# Patient Record
Sex: MALE | Race: WHITE | Employment: OTHER | RURAL
[De-identification: names, ages, dates, MRNs, and addresses within clinical notes are randomized per-mention and may not be internally consistent; named-entity substitution may affect disease eponyms.]

---

## 2017-01-01 ENCOUNTER — TELEPHONE (OUTPATIENT)
Dept: FAMILY MEDICINE CLINIC | Age: 72
End: 2017-01-01

## 2017-01-01 ENCOUNTER — APPOINTMENT (OUTPATIENT)
Dept: GENERAL RADIOLOGY | Age: 72
End: 2017-01-01
Attending: EMERGENCY MEDICINE
Payer: MEDICARE

## 2017-01-01 ENCOUNTER — HOSPITAL ENCOUNTER (EMERGENCY)
Age: 72
Discharge: HOME | End: 2017-04-07
Attending: EMERGENCY MEDICINE | Admitting: EMERGENCY MEDICINE
Payer: MEDICARE

## 2017-01-01 ENCOUNTER — HOSPITAL ENCOUNTER (EMERGENCY)
Age: 72
Discharge: HOME | End: 2017-03-21
Attending: EMERGENCY MEDICINE | Admitting: EMERGENCY MEDICINE
Payer: MEDICARE

## 2017-01-01 VITALS
BODY MASS INDEX: 35.99 KG/M2 | HEART RATE: 95 BPM | TEMPERATURE: 98.3 F | HEIGHT: 65 IN | OXYGEN SATURATION: 94 % | RESPIRATION RATE: 16 BRPM | SYSTOLIC BLOOD PRESSURE: 115 MMHG | DIASTOLIC BLOOD PRESSURE: 84 MMHG | WEIGHT: 216 LBS

## 2017-01-01 VITALS
BODY MASS INDEX: 35.99 KG/M2 | RESPIRATION RATE: 16 BRPM | DIASTOLIC BLOOD PRESSURE: 91 MMHG | WEIGHT: 216 LBS | TEMPERATURE: 97.3 F | SYSTOLIC BLOOD PRESSURE: 131 MMHG | HEIGHT: 65 IN | HEART RATE: 83 BPM | OXYGEN SATURATION: 92 %

## 2017-01-01 DIAGNOSIS — N30.01 ACUTE CYSTITIS WITH HEMATURIA: Primary | ICD-10-CM

## 2017-01-01 DIAGNOSIS — K59.00 CONSTIPATION, UNSPECIFIED CONSTIPATION TYPE: Primary | ICD-10-CM

## 2017-01-01 LAB
ALBUMIN SERPL BCP-MCNC: 2.9 G/DL (ref 3.5–5)
ALBUMIN SERPL BCP-MCNC: 3.1 G/DL (ref 3.5–5)
ALBUMIN/GLOB SERPL: 0.5 {RATIO} (ref 1.1–2.2)
ALBUMIN/GLOB SERPL: 0.6 {RATIO} (ref 1.1–2.2)
ALP SERPL-CCNC: 88 U/L (ref 45–117)
ALP SERPL-CCNC: 91 U/L (ref 45–117)
ALT SERPL-CCNC: 19 U/L (ref 12–78)
ALT SERPL-CCNC: 19 U/L (ref 12–78)
ANION GAP BLD CALC-SCNC: 4 MMOL/L (ref 5–15)
ANION GAP BLD CALC-SCNC: 7 MMOL/L (ref 5–15)
APPEARANCE UR: ABNORMAL
AST SERPL W P-5'-P-CCNC: 19 U/L (ref 15–37)
AST SERPL W P-5'-P-CCNC: 23 U/L (ref 15–37)
BACTERIA SPEC CULT: ABNORMAL
BACTERIA URNS QL MICRO: ABNORMAL /HPF
BASOPHILS # BLD AUTO: 0.1 K/UL (ref 0–0.1)
BASOPHILS # BLD AUTO: 0.1 K/UL (ref 0–0.1)
BASOPHILS # BLD: 1 % (ref 0–1)
BASOPHILS # BLD: 1 % (ref 0–1)
BILIRUB SERPL-MCNC: 0.3 MG/DL (ref 0.2–1)
BILIRUB SERPL-MCNC: 0.4 MG/DL (ref 0.2–1)
BILIRUB UR QL: NEGATIVE
BUN SERPL-MCNC: 24 MG/DL (ref 6–20)
BUN SERPL-MCNC: 30 MG/DL (ref 6–20)
BUN/CREAT SERPL: 27 (ref 12–20)
BUN/CREAT SERPL: 29 (ref 12–20)
CALCIUM SERPL-MCNC: 9.5 MG/DL (ref 8.5–10.1)
CALCIUM SERPL-MCNC: 9.7 MG/DL (ref 8.5–10.1)
CC UR VC: ABNORMAL
CHLORIDE SERPL-SCNC: 101 MMOL/L (ref 97–108)
CHLORIDE SERPL-SCNC: 99 MMOL/L (ref 97–108)
CO2 SERPL-SCNC: 34 MMOL/L (ref 21–32)
CO2 SERPL-SCNC: 35 MMOL/L (ref 21–32)
COLOR UR: ABNORMAL
CREAT SERPL-MCNC: 0.82 MG/DL (ref 0.7–1.3)
CREAT SERPL-MCNC: 1.11 MG/DL (ref 0.7–1.3)
EOSINOPHIL # BLD: 0.2 K/UL (ref 0–0.4)
EOSINOPHIL # BLD: 0.2 K/UL (ref 0–0.4)
EOSINOPHIL NFR BLD: 2 % (ref 0–7)
EOSINOPHIL NFR BLD: 3 % (ref 0–7)
EPITH CASTS URNS QL MICRO: ABNORMAL /LPF
ERYTHROCYTE [DISTWIDTH] IN BLOOD BY AUTOMATED COUNT: 15.7 % (ref 11.5–14.5)
ERYTHROCYTE [DISTWIDTH] IN BLOOD BY AUTOMATED COUNT: 15.8 % (ref 11.5–14.5)
GLOBULIN SER CALC-MCNC: 5.1 G/DL (ref 2–4)
GLOBULIN SER CALC-MCNC: 5.4 G/DL (ref 2–4)
GLUCOSE SERPL-MCNC: 83 MG/DL (ref 65–100)
GLUCOSE SERPL-MCNC: 92 MG/DL (ref 65–100)
GLUCOSE UR STRIP.AUTO-MCNC: NEGATIVE MG/DL
HCT VFR BLD AUTO: 45 % (ref 36.6–50.3)
HCT VFR BLD AUTO: 46 % (ref 36.6–50.3)
HGB BLD-MCNC: 15 G/DL (ref 12.1–17)
HGB BLD-MCNC: 15.4 G/DL (ref 12.1–17)
HGB UR QL STRIP: ABNORMAL
HYALINE CASTS URNS QL MICRO: ABNORMAL /LPF (ref 0–5)
KETONES UR QL STRIP.AUTO: ABNORMAL MG/DL
LEUKOCYTE ESTERASE UR QL STRIP.AUTO: ABNORMAL
LYMPHOCYTES # BLD AUTO: 15 % (ref 12–49)
LYMPHOCYTES # BLD AUTO: 25 % (ref 12–49)
LYMPHOCYTES # BLD: 1.6 K/UL (ref 0.8–3.5)
LYMPHOCYTES # BLD: 2.1 K/UL (ref 0.8–3.5)
MCH RBC QN AUTO: 29 PG (ref 26–34)
MCH RBC QN AUTO: 30.4 PG (ref 26–34)
MCHC RBC AUTO-ENTMCNC: 32.6 G/DL (ref 30–36.5)
MCHC RBC AUTO-ENTMCNC: 34.2 G/DL (ref 30–36.5)
MCV RBC AUTO: 88.9 FL (ref 80–99)
MCV RBC AUTO: 89 FL (ref 80–99)
MONOCYTES # BLD: 0.9 K/UL (ref 0–1)
MONOCYTES # BLD: 1.4 K/UL (ref 0–1)
MONOCYTES NFR BLD AUTO: 10 % (ref 5–13)
MONOCYTES NFR BLD AUTO: 14 % (ref 5–13)
MUCOUS THREADS URNS QL MICRO: ABNORMAL /LPF
NEUTS SEG # BLD: 5.2 K/UL (ref 1.8–8)
NEUTS SEG # BLD: 7 K/UL (ref 1.8–8)
NEUTS SEG NFR BLD AUTO: 61 % (ref 32–75)
NEUTS SEG NFR BLD AUTO: 68 % (ref 32–75)
NITRITE UR QL STRIP.AUTO: POSITIVE
PH UR STRIP: 5.5 [PH] (ref 5–8)
PLATELET # BLD AUTO: 207 K/UL (ref 150–400)
PLATELET # BLD AUTO: 231 K/UL (ref 150–400)
POTASSIUM SERPL-SCNC: 4.5 MMOL/L (ref 3.5–5.1)
POTASSIUM SERPL-SCNC: 4.5 MMOL/L (ref 3.5–5.1)
PROT SERPL-MCNC: 8.2 G/DL (ref 6.4–8.2)
PROT SERPL-MCNC: 8.3 G/DL (ref 6.4–8.2)
PROT UR STRIP-MCNC: 100 MG/DL
RBC # BLD AUTO: 5.06 M/UL (ref 4.1–5.7)
RBC # BLD AUTO: 5.17 M/UL (ref 4.1–5.7)
RBC #/AREA URNS HPF: ABNORMAL /HPF (ref 0–5)
SERVICE CMNT-IMP: ABNORMAL
SODIUM SERPL-SCNC: 138 MMOL/L (ref 136–145)
SODIUM SERPL-SCNC: 142 MMOL/L (ref 136–145)
SP GR UR REFRACTOMETRY: 1.02 (ref 1–1.03)
UROBILINOGEN UR QL STRIP.AUTO: 1 EU/DL (ref 0.2–1)
WBC # BLD AUTO: 10.3 K/UL (ref 4.1–11.1)
WBC # BLD AUTO: 8.5 K/UL (ref 4.1–11.1)
WBC URNS QL MICRO: ABNORMAL /HPF (ref 0–4)

## 2017-01-01 PROCEDURE — 74011250636 HC RX REV CODE- 250/636: Performed by: EMERGENCY MEDICINE

## 2017-01-01 PROCEDURE — 96361 HYDRATE IV INFUSION ADD-ON: CPT

## 2017-01-01 PROCEDURE — 74011000258 HC RX REV CODE- 258: Performed by: EMERGENCY MEDICINE

## 2017-01-01 PROCEDURE — 94762 N-INVAS EAR/PLS OXIMTRY CONT: CPT

## 2017-01-01 PROCEDURE — 80053 COMPREHEN METABOLIC PANEL: CPT | Performed by: EMERGENCY MEDICINE

## 2017-01-01 PROCEDURE — 36415 COLL VENOUS BLD VENIPUNCTURE: CPT | Performed by: EMERGENCY MEDICINE

## 2017-01-01 PROCEDURE — 74011250637 HC RX REV CODE- 250/637: Performed by: EMERGENCY MEDICINE

## 2017-01-01 PROCEDURE — 87086 URINE CULTURE/COLONY COUNT: CPT | Performed by: EMERGENCY MEDICINE

## 2017-01-01 PROCEDURE — 74022 RADEX COMPL AQT ABD SERIES: CPT

## 2017-01-01 PROCEDURE — 85025 COMPLETE CBC W/AUTO DIFF WBC: CPT | Performed by: EMERGENCY MEDICINE

## 2017-01-01 PROCEDURE — 87186 SC STD MICRODIL/AGAR DIL: CPT | Performed by: EMERGENCY MEDICINE

## 2017-01-01 PROCEDURE — 96365 THER/PROPH/DIAG IV INF INIT: CPT

## 2017-01-01 PROCEDURE — 99285 EMERGENCY DEPT VISIT HI MDM: CPT

## 2017-01-01 PROCEDURE — 77030005514 HC CATH URETH FOL14 BARD -A

## 2017-01-01 PROCEDURE — 81001 URINALYSIS AUTO W/SCOPE: CPT | Performed by: EMERGENCY MEDICINE

## 2017-01-01 PROCEDURE — 99283 EMERGENCY DEPT VISIT LOW MDM: CPT

## 2017-01-01 PROCEDURE — 87077 CULTURE AEROBIC IDENTIFY: CPT | Performed by: EMERGENCY MEDICINE

## 2017-01-01 RX ORDER — MAGNESIUM CITRATE
296 SOLUTION, ORAL ORAL
Status: COMPLETED | OUTPATIENT
Start: 2017-01-01 | End: 2017-01-01

## 2017-01-01 RX ORDER — POLYETHYLENE GLYCOL 3350 17 G/17G
17 POWDER, FOR SOLUTION ORAL 2 TIMES DAILY
Qty: 1530 G | Refills: 0 | Status: SHIPPED | OUTPATIENT
Start: 2017-01-01

## 2017-01-01 RX ORDER — FACIAL-BODY WIPES
10 EACH TOPICAL DAILY
COMMUNITY

## 2017-01-01 RX ORDER — DOCUSATE SODIUM 100 MG/1
200 CAPSULE, LIQUID FILLED ORAL 2 TIMES DAILY
COMMUNITY

## 2017-01-01 RX ORDER — CEPHALEXIN 500 MG/1
500 CAPSULE ORAL 4 TIMES DAILY
Qty: 28 CAP | Refills: 0 | Status: SHIPPED | OUTPATIENT
Start: 2017-01-01 | End: 2017-01-01

## 2017-01-01 RX ORDER — LISINOPRIL 5 MG/1
TABLET ORAL
Qty: 30 TAB | Refills: 11 | Status: SHIPPED | OUTPATIENT
Start: 2017-01-01

## 2017-01-01 RX ORDER — MAGNESIUM CITRATE
SOLUTION, ORAL ORAL
Qty: 2 BOTTLE | Refills: 0 | Status: SHIPPED | OUTPATIENT
Start: 2017-01-01

## 2017-01-01 RX ORDER — POLYETHYLENE GLYCOL 3350 17 G/17G
17 POWDER, FOR SOLUTION ORAL AS NEEDED
COMMUNITY

## 2017-01-01 RX ADMIN — CEFTRIAXONE 1 G: 1 INJECTION, POWDER, FOR SOLUTION INTRAMUSCULAR; INTRAVENOUS at 02:01

## 2017-01-01 RX ADMIN — SODIUM CHLORIDE 1000 ML: 900 INJECTION, SOLUTION INTRAVENOUS at 01:53

## 2017-01-01 RX ADMIN — MAGESIUM CITRATE 296 ML: 1.75 LIQUID ORAL at 03:09

## 2017-01-01 RX ADMIN — MAGESIUM CITRATE 296 ML: 1.75 LIQUID ORAL at 00:48

## 2017-02-17 NOTE — TELEPHONE ENCOUNTER
Pt's daughter called needs a letter stating pt is unable to shop by himself due to the different DX he has, wheelchair etc.. This is so the aids or  can go with him and can get a  pass. We are to call daughter when it is done and she will come and pick it up.

## 2017-03-21 NOTE — ED PROVIDER NOTES
HPI Comments: Moni Pitts is a 66 y.o. Male with hx of ALS, HTN, and fecal impaction who presents to the ED c/o constipation x 1 week. Per family, pt's last BM was 4-5 days ago and has been experiencing upper abdominal discomfort with PO intake which is worse tonight. Pt has reportedly been taking Colace BID, Miralax every other day, and using stool suppositories every other day but has found no relief in constipation. Family notes pt has a PEG tube that was placed in June 2807 without complications, but still continues to occasionally eat PO. Relative states that pt has been deconditioning over the past 2 months from progressively worsening ALS, and is now using a condom catheter. Also of note, pt is suppose to be using CPAP at night for his sleep apnea, but pt has been noncompliant with its use. Pt specifically denies any recent vomiting. Social hx: + Tobacco use (former smoker), - EtOH use, - Illicit drug use    PCP: NATHEN Parker/ Jem Amado    There are no other complaints, changes or physical findings at this time. The history is provided by the patient and a relative.         Past Medical History:   Diagnosis Date    ALS (amyotrophic lateral sclerosis) (HCC)     Anxiety disorder     Arrhythmia     exersize induced svt    Arthritis     BPH (benign prostatic hypertrophy)     [facility]chronic obstructive [facility] disease) ([facility] 9/4/2013    Duodenal ulcer     Falls     Fatigue     Headache     Hematuria     Hyperlipidemia     Hyperlipidemia 9/4/2013    Hypertension     IBS (irritable bowel syndrome)     Muscle weakness     Osteoarthritis     Other ill-defined conditions(799.89)     elevated cholesterol    Other ill-defined conditions(799.89)     [place] right ear    PAD (peripheral artery disease) (HCC)     Proteinuria     Psychiatric disorder     anxiety    PUD (peptic ulcer disease)     Unspecified adverse effect of anesthesia     TAKES A LONG TIME TO QWBF AFTER ANESTHESIA    Unspecified sleep apnea     no cpap       Past Surgical History:   Procedure Laterality Date    HX BACK SURGERY      HX CATARACT REMOVAL      bilateral    HX HEART CATHETERIZATION      negative    HX HEENT  11/2014    nasal turbinate reduction    HX HEMORRHOIDECTOMY      x2    HX HIP REPLACEMENT  04/2014    HX ORTHOPAEDIC      RT TOTAL HIP.     HX ORTHOPAEDIC  4/14/15    C3-C5 ANTERIOR CERVICAL DISCECTOMY WITH FUSION    HX TONSILLECTOMY      HX VASECTOMY      PLACE PERCUT GASTROSTOMY TUBE  7/8/2016         MI COLSC FLX W/RMVL OF TUMOR POLYP LESION SNARE TQ  8/13/2012              Family History:   Problem Relation Age of Onset    Cancer Mother      Breast    Hypertension Father     Obesity Father     Diabetes Father     Arthritis-osteo Sister     No Known Problems Brother      did not know his medical issues    Alcohol abuse Brother        Social History     Social History    Marital status:      Spouse name: N/A    Number of children: N/A    Years of education: N/A     Occupational History    Not on file. Social History Main Topics    Smoking status: Former Smoker     Packs/day: 1.00     Years: 55.00     Quit date: 4/20/2015    Smokeless tobacco: Never Used    Alcohol use No      Comment: occasional    Drug use: No    Sexual activity: Not Currently     Partners: Female     Other Topics Concern    Not on file     Social History Narrative         ALLERGIES: Nsaids (non-steroidal anti-inflammatory drug); Other medication; and No known drug allergies    Review of Systems   Constitutional: Negative. Negative for appetite change, chills, fatigue and fever. HENT: Negative. Negative for congestion and sore throat. Eyes: Negative. Negative for visual disturbance. Respiratory: Negative. Negative for cough and shortness of breath. Cardiovascular: Negative. Negative for chest pain, palpitations and leg swelling.    Gastrointestinal: Positive for abdominal pain and constipation. Negative for diarrhea, nausea and vomiting. Genitourinary: Negative. Negative for decreased urine volume, dysuria, flank pain and hematuria. Musculoskeletal: Negative. Negative for back pain and neck pain. Skin: Negative. Negative for rash. Neurological: Negative. Negative for dizziness, syncope, weakness, numbness and headaches. Hematological: Negative. Psychiatric/Behavioral: Negative. All other systems reviewed and are negative. Patient Vitals for the past 12 hrs:   Temp Pulse Resp BP SpO2   03/21/17 0222 - 79 23 - 92 %   03/21/17 0221 - 82 20 - (!) 87 %   03/21/17 0215 - 81 26 (!) 148/105 (!) 87 %   03/21/17 0201 - 81 20 - -   03/21/17 0200 - 73 9 (!) 131/101 -   03/21/17 0149 - 74 (!) 6 (!) 147/96 -   03/21/17 0115 - 82 26 (!) 148/135 91 %   03/21/17 0103 - 80 25 - 92 %   03/21/17 0100 - 84 25 (!) 143/106 92 %   03/21/17 0055 - 82 28 (!) 125/103 90 %   03/21/17 0039 - - - - 92 %   03/21/17 0004 98.3 °F (36.8 °C) 85 20 (!) 153/110 -              Physical Exam   Constitutional: He is oriented to person, place, and time. He appears well-developed and well-nourished. No distress. HENT:   Head: Normocephalic and atraumatic. Nose: Nose normal.   Mouth/Throat: No oropharyngeal exudate. Eyes: Conjunctivae and EOM are normal. Pupils are equal, round, and reactive to light. Neck: Normal range of motion. Neck supple. No JVD present. Cardiovascular: Normal rate, regular rhythm, normal heart sounds and intact distal pulses. Exam reveals no friction rub. No murmur heard. Pulmonary/Chest: Effort normal and breath sounds normal. No stridor. No respiratory distress. He has no wheezes. He has no rales. Abdominal: Soft. Bowel sounds are normal. He exhibits no distension. There is no tenderness. There is no rebound. Musculoskeletal: Normal range of motion. He exhibits no tenderness. Neurological: He is alert and oriented to person, place, and time.  He displays atrophy. He displays no tremor. No cranial nerve deficit. He exhibits abnormal muscle tone (pt with progressive ALS, w/c bound, minimal independet movements). He displays no seizure activity. Gait abnormal. GCS eye subscore is 4. GCS verbal subscore is 5. Skin: Skin is warm and dry. No rash noted. He is not diaphoretic. Psychiatric: He has a normal mood and affect. His behavior is normal. Judgment and thought content normal. His speech is slurred (2/2 ALS, deliberate and understandable). Cognition and memory are normal.   Nursing note and vitals reviewed. MDM  Number of Diagnoses or Management Options  Constipation, unspecified constipation type:   Diagnosis management comments: DDX:  Constipation, sbo, aspiration, pna, electrolyte abnormality    Plan:  Labs, acute series, sse, mag citrate    Impression:  constipation         Amount and/or Complexity of Data Reviewed  Clinical lab tests: ordered and reviewed  Tests in the radiology section of CPT®: ordered and reviewed  Obtain history from someone other than the patient: yes (Family)  Review and summarize past medical records: yes    Patient Progress  Patient progress: improved      Procedures    I reviewed our electronic medical record system for any past medical records that were available that may contribute to the patients current condition, the nursing notes and and vital signs from today's visit    Nursing notes will be reviewed as they become available in realtime while the pt has been in the ED. Ines Ojeda MD    1:02 AM  Pt's rate is 85 with a NSR rhythm as noted on Cardiac monitor. SpO2 is 94%, on 2L    I personally reviewed pt's imaging. Official read by radiology listed below. Ines Ojeda MD    4:12 AM  Progress note:  Pt noted to be feeling better, able to pass some stool notes he feels ready for discharge. Discussed lab and imaging findings with pt and family. Will follow up as instructed.  All questions have been answered, pt voiced understanding and agreement with plan. If narcotics were prescribed, pt was advised not to drive or operate heavy machinery. If abx were prescribed, pt advised that diarrhea and rash are possible side effects of the medications. Specific return precautions provided as well as instructions to return to the ED should sx worsen at any time. Kleber Louis MD    LABORATORY TESTS:  Recent Results (from the past 12 hour(s))   CBC WITH AUTOMATED DIFF    Collection Time: 03/21/17 12:30 AM   Result Value Ref Range    WBC 8.5 4.1 - 11.1 K/uL    RBC 5.06 4.10 - 5.70 M/uL    HGB 15.4 12.1 - 17.0 g/dL    HCT 45.0 36.6 - 50.3 %    MCV 88.9 80.0 - 99.0 FL    MCH 30.4 26.0 - 34.0 PG    MCHC 34.2 30.0 - 36.5 g/dL    RDW 15.8 (H) 11.5 - 14.5 %    PLATELET 011 918 - 674 K/uL    NEUTROPHILS 61 32 - 75 %    LYMPHOCYTES 25 12 - 49 %    MONOCYTES 10 5 - 13 %    EOSINOPHILS 3 0 - 7 %    BASOPHILS 1 0 - 1 %    ABS. NEUTROPHILS 5.2 1.8 - 8.0 K/UL    ABS. LYMPHOCYTES 2.1 0.8 - 3.5 K/UL    ABS. MONOCYTES 0.9 0.0 - 1.0 K/UL    ABS. EOSINOPHILS 0.2 0.0 - 0.4 K/UL    ABS. BASOPHILS 0.1 0.0 - 0.1 K/UL   METABOLIC PANEL, COMPREHENSIVE    Collection Time: 03/21/17 12:30 AM   Result Value Ref Range    Sodium 142 136 - 145 mmol/L    Potassium 4.5 3.5 - 5.1 mmol/L    Chloride 101 97 - 108 mmol/L    CO2 34 (H) 21 - 32 mmol/L    Anion gap 7 5 - 15 mmol/L    Glucose 83 65 - 100 mg/dL    BUN 24 (H) 6 - 20 MG/DL    Creatinine 0.82 0.70 - 1.30 MG/DL    BUN/Creatinine ratio 29 (H) 12 - 20      GFR est AA >60 >60 ml/min/1.73m2    GFR est non-AA >60 >60 ml/min/1.73m2    Calcium 9.7 8.5 - 10.1 MG/DL    Bilirubin, total 0.3 0.2 - 1.0 MG/DL    ALT (SGPT) 19 12 - 78 U/L    AST (SGOT) 19 15 - 37 U/L    Alk. phosphatase 91 45 - 117 U/L    Protein, total 8.2 6.4 - 8.2 g/dL    Albumin 3.1 (L) 3.5 - 5.0 g/dL    Globulin 5.1 (H) 2.0 - 4.0 g/dL    A-G Ratio 0.6 (L) 1.1 - 2.2         IMAGING RESULTS:  XR ABD ACUTE W 1 V CHEST   INDICATION: constipation, ? aspiration      EXAM: ACUTE ABDOMINAL SERIES      COMPARISON: 8/17/2016     FINDINGS:  Single view of the chest demonstrates a normal cardiomediastinal silhouette. The lungs are well expanded and clear. There is no free intraperitoneal air. Supine and upright views of the abdomen demonstrate a nonobstructive bowel gas  pattern. Gas is seen throughout normal caliber colon. There is no abnormal  bowel dilatation. There is a right hip arthroplasty.     IMPRESSION  IMPRESSION:  No acute process. Moderate constipation. MEDICATIONS GIVEN:  Medications   magnesium citrate solution 296 mL (296 mL Oral Given 3/21/17 0309)       IMPRESSION:  1. Constipation, unspecified constipation type        PLAN:  1. Discharge home  Current Discharge Medication List      START taking these medications    Details   ! ! polyethylene glycol (MIRALAX) 17 gram/dose powder Take 17 g by mouth two (2) times a day. 1 tablespoon with 8 oz of water daily  Qty: 1530 g, Refills: 0       !! - Potential duplicate medications found. Please discuss with provider. CONTINUE these medications which have NOT CHANGED    Details   ! ! polyethylene glycol (MIRALAX) 17 gram/dose powder Take 17 g by mouth as needed. !! - Potential duplicate medications found. Please discuss with provider. 2.   Follow-up Information     Follow up With Details Comments [address] [place], [place] Schedule an appointment as soon as possible for a visit in 2 days  [address] [phone]        Return to ED if worse     DISCHARGE NOTE  4:12 AM  The patient has been re-evaluated and is ready for discharge. Reviewed available results with patient. Counseled pt on diagnosis and care plan. Pt has expressed understanding, and all questions have been answered. Pt agrees with plan and agrees to F/U as recommended, or return to the ED if their sxs worsen.  Discharge instructions have been provided and explained to the pt, along with reasons to return to the ED. This note is prepared by Carol Crain, acting as Scribe for Emily Treviño MD.    Emily Treviño MD: The scribe's documentation has been prepared under my direction and personally reviewed by me in its entirety. I confirm that the note above accurately reflects all work, treatment, procedures, and medical decision making performed by me. This note will not be viewable in [address]

## 2017-03-21 NOTE — ED NOTES
Discharge instructions reviewed with pt and copy given along with RX by ER MD Kevin Costello. Patient discharged by personal wheelchair accompanied by family x2 in no sign of distress or discomfort at this time.

## 2017-03-21 NOTE — ED NOTES
Pt condom catheter and urine bag changed out at this time. Patient repositioned in ED bed by ED staff x2. Call light within reach. Family x2 at bedside. Patient placed on 2L nasal cannula at this time to bring oxygen saturation above 90%.  ER MD made aware

## 2017-03-21 NOTE — ED NOTES
Patient reports he has not had BM in the past week and has been progressively having declining symptoms with his ALS over the past 2 months and having increased difficulty having BMs. ER MD Brennan Rossi at bedside evaluating patients at this time.

## 2017-03-21 NOTE — ED NOTES
Patient reports he \"feels a little better at this time that if his pain was a 4 before out of 10 that it is now a 2 out of 10.\" ER MD Avanell Koyanagi made aware

## 2017-03-21 NOTE — DISCHARGE INSTRUCTIONS
Constipation: Care Instructions  Your Care Instructions  Constipation means that you have a hard time passing stools (bowel movements). People pass stools from 3 times a day to once every 3 days. What is normal for you may be different. Constipation may occur with pain in the rectum and cramping. The pain may get worse when you try to pass stools. Sometimes there are small amounts of bright red blood on toilet paper or the surface of stools. This is because of enlarged veins near the rectum (hemorrhoids). A few changes in your diet and lifestyle may help you avoid ongoing constipation. Your doctor may also prescribe medicine to help loosen your stool. Some medicines can cause constipation. These include pain medicines and antidepressants. Tell your doctor about all the medicines you take. Your doctor may want to make a medicine change to ease your symptoms. Follow-up care is a key part of your treatment and safety. Be sure to make and go to all appointments, and call your doctor if you are having problems. It's also a good idea to know your test results and keep a list of the medicines you take. How can you care for yourself at home? · Drink plenty of fluids, enough so that your urine is light yellow or clear like water. If you have kidney, heart, or liver disease and have to limit fluids, talk with your doctor before you increase the amount of fluids you drink. · Include high-fiber foods in your diet each day. These include fruits, vegetables, beans, and whole grains. · Get at least 30 minutes of exercise on most days of the week. Walking is a good choice. You also may want to do other activities, such as running, swimming, cycling, or playing tennis or team sports. · Take a fiber supplement, such as Citrucel or Metamucil, every day. Read and follow all instructions on the label. · Schedule time each day for a bowel movement. A daily routine may help.  Take your time having your bowel movement. · Support your feet with a small step stool when you sit on the toilet. This helps flex your hips and places your pelvis in a squatting position. · Your doctor may recommend an over-the-counter laxative to relieve your constipation. Examples are Milk of Magnesia and MiraLax. Read and follow all instructions on the label. Do not use laxatives on a long-term basis. When should you call for help? Call your doctor now or seek immediate medical care if:  · You have new or worse belly pain. · You have new or worse nausea or vomiting. · You have blood in your stools. Watch closely for changes in your health, and be sure to contact your doctor if:  · Your constipation is getting worse. · You do not get better as expected. Where can you learn more? Go to http://arnoldo-jackie.info/. Enter 21 [phone] in the search box to learn more about \"Constipation: Care Instructions. \"  Current as of: May 27, 2016  Content Version: 11.1  © 3858-2959 [employer]. Care instructions adapted under license by [employer] (which disclaims liability or warranty for this information). If you have questions about a medical condition or this instruction, always ask your healthcare professional. [address] any warranty or liability for your use of this information.

## 2017-04-07 NOTE — ED NOTES
Patient placed in wheelchair with shantell lift by lift team at this time. Patient discharged via wheelchair accompanied by daughter in no sign of distress or discomfort at this time.

## 2017-04-07 NOTE — ED PROVIDER NOTES
HPI Comments: Osmani Villagran is a 66 y.o. male with history significant for HTN and ALS presenting ambulatory to [facility] ED with c/o chronic constipation x several weeks. Per family, pt was seen at [facility] ED x 2.5 weeks for similar concerns where he was discharged with Miralax and advised to take laxatives and suppositories as needed, last use x 2 days. Per family member, they have been doing the aforementioned regime daily with suppositories as needed. Family states that the pt has experienced little relief with persistent constipation. Family states pt additionally informs of pain to the lower abdomen and lower back, which is a new symptom from his prior presentations of constipation. Family states that the pt has 2 small, soft stool bowel movements yesterday but informs that the pt had increased straining and difficulty to pass them. Family states that the pt informs he has been generally feeling \"bad\" for the past several days in addition to this constipation and associated pains. Family notes pt has been having mild difficulty breathing to where he has been using his BiPAP machine during the day as well. Family member does state that the pt is under consideration for a trache placement due to decreased lung capacity. Family also notes that the pt has been unable to take solid PO and has been being fed soft foods as it is too \"strenuous\". Family member states that the pt has had two feedings via tube today with success, although she notes that when flushing the tube with water, there was some water back up. Family notes that the pt has additionally been having a harder time sipping through a straw and has displayed increased coughing with PO. Family does state that the pt has been having a moderate amount of fluid intake consisting of sodas, Gatorade, and water. Pt and family member specifically deny any nausea, vomiting, fevers, chills, headache, chest pain, or SOB.     PCP: NATHEN Finch    PMHx: arthritis, hyperlipidemia, OA  PSHx: hemorrhoidectomy, vasectomy, tonsillectomy, cardiac catheterization, discectomy with fusion   Social Hx: - EtOH; + Smoker; - Illicit Drugs    There are no other changes, complaints or physical findings at this time. The history is provided by a relative and the patient.       Past Medical History:   Diagnosis Date    ALS (amyotrophic lateral sclerosis) (HCC)     Anxiety disorder     Arrhythmia     exersize induced svt    Arthritis     BPH (benign prostatic hypertrophy)     [facility]ch[facility]    Duodenal ulcer     Falls     Fatigue     Headache     Hematuria     Hyperlipidemia     Hyperlipidemia 9/4/2013    Hypertension     IBS (irritable bowel syndrome)     Muscle weakness     Osteoarthritis     Other ill-defined conditions     elevated cholesterol    Other ill-defined conditions     [place] right ear    PAD (peripheral artery disease) (HCC)     Proteinuria     Psychiatric disorder     anxiety    PUD (peptic ulcer disease)     Unspecified adverse effect of anesthesia     TAKES A LONG TIME TO XXZH AFTER ANESTHESIA    Unspecified sleep apnea     no cpap     Past Surgical History:   Procedure Laterality Date    HX BACK SURGERY      HX CATARACT REMOVAL      bilateral    HX HEART CATHETERIZATION      negative    HX HEENT  11/2014    nasal turbinate reduction    HX HEMORRHOIDECTOMY      x2    HX HIP REPLACEMENT  04/2014    HX ORTHOPAEDIC      RT TOTAL HIP.     HX ORTHOPAEDIC  4/14/15    C3-C5 ANTERIOR CERVICAL DISCECTOMY WITH FUSION    HX TONSILLECTOMY      HX VASECTOMY      PLACE PERCUT GASTROSTOMY TUBE  7/8/2016         CT COLSC FLX W/RMVL OF TUMOR POLYP LESION SNARE TQ  8/13/2012          Family History:   Problem Relation Age of Onset    Cancer Mother      Breast    Hypertension Father     Obesity Father     Diabetes Father     Arthritis-osteo Sister     No Known Problems Brother      did not know his medical issues    Alcohol abuse Brother      Social History     Social History    Marital status:      Spouse name: N/A    Number of children: N/A    Years of education: N/A     Occupational History    Not on file. Social History Main Topics    Smoking status: Former Smoker     Packs/day: 1.00     Years: 55.00     Quit date: 4/20/2015    Smokeless tobacco: Never Used    Alcohol use No    Drug use: No    Sexual activity: Not Currently     Partners: Female     Other Topics Concern    Not on file     Social History Narrative     ALLERGIES: Nsaids (non-steroidal anti-inflammatory drug); Other medication; and No known drug allergies    Review of Systems   Constitutional: Negative for activity change, appetite change, chills, fever and unexpected weight change. HENT: Negative for congestion. Eyes: Negative for pain and visual disturbance. Respiratory: Negative for cough and shortness of breath. Cardiovascular: Negative for chest pain. Gastrointestinal: Positive for abdominal pain and constipation. Negative for diarrhea, nausea and vomiting. Genitourinary: Negative for dysuria. Musculoskeletal: Positive for back pain. Skin: Negative for rash. Neurological: Negative for headaches. Vitals:    04/06/17 2304 04/07/17 0015 04/07/17 0345   BP: (!) 123/94  (!) 131/91   Pulse: 83     Resp: 16     Temp: 97.3 °F (36.3 °C)     SpO2: 95% 95% 93%   Weight: 98 kg (216 lb)     Height: 5' 5\" (1.651 m)            Physical Exam   Constitutional: He is oriented to person, place, and time. He appears well-developed and well-nourished. HENT:   Head: Normocephalic and atraumatic. Mouth/Throat: Oropharynx is clear and moist.   Eyes: Conjunctivae and EOM are normal. Pupils are equal, round, and reactive to light. Right eye exhibits no discharge. Left eye exhibits no discharge. Neck: Normal range of motion. Neck supple. Cardiovascular: Normal rate and normal heart sounds.     No murmur heard.  Pulmonary/Chest: Effort normal and breath sounds normal. No respiratory distress. He has no wheezes. He has no rales. Abdominal: Soft. He exhibits no distension. Bowel sounds are increased. There is no tenderness. Musculoskeletal: Normal range of motion. Neurological: He is alert and oriented to person, place, and time. No cranial nerve deficit. He exhibits normal muscle tone. Skin: Skin is warm and dry. No rash noted. He is not diaphoretic. Nursing note and vitals reviewed. MDM  Number of Diagnoses or Management Options  Acute cystitis with hematuria:   Diagnosis management comments: Rule out ileus, infection with UTI, aspiration PNA. Currently without evidence of sepsis. Amount and/or Complexity of Data Reviewed  Clinical lab tests: ordered and reviewed  Tests in the radiology section of CPT®: ordered and reviewed  Obtain history from someone other than the patient: yes (Family)  Review and summarize past medical records: yes  Independent visualization of images, tracings, or specimens: yes    Patient Progress  Patient progress: stable    ED Course     Procedures     Progress Note:   1:14 AM  Discussed results and options with the pt and family. Will hold CT at this time, and vent Gtube. Upon opening tube, large amount of air released and pt states he feels better. Written by Munira Culp ED Scribe, as dictated by Silvestre Olmstead MD.     Progress Note:   3:54 AM  Continues to improve. Abdomen soft. Antibiotics infused for UTI. Discussed constipation and prescriptions given.    Written by Munira Culp ED Scribe, as dictated by Silvestre Olmstead MD.     LABORATORY TESTS:  Recent Results (from the past 12 hour(s))   CBC WITH AUTOMATED DIFF    Collection Time: 04/07/17 12:14 AM   Result Value Ref Range    WBC 10.3 4.1 - 11.1 K/uL    RBC 5.17 4.10 - 5.70 M/uL    HGB 15.0 12.1 - 17.0 g/dL    HCT 46.0 36.6 - 50.3 %    MCV 89.0 80.0 - 99.0 FL    MCH 29.0 26.0 - 34.0 PG    MCHC 32.6 30.0 - 36.5 g/dL    RDW 15.7 (H) 11.5 - 14.5 %    PLATELET 088 846 - 853 K/uL    NEUTROPHILS 68 32 - 75 %    LYMPHOCYTES 15 12 - 49 %    MONOCYTES 14 (H) 5 - 13 %    EOSINOPHILS 2 0 - 7 %    BASOPHILS 1 0 - 1 %    ABS. NEUTROPHILS 7.0 1.8 - 8.0 K/UL    ABS. LYMPHOCYTES 1.6 0.8 - 3.5 K/UL    ABS. MONOCYTES 1.4 (H) 0.0 - 1.0 K/UL    ABS. EOSINOPHILS 0.2 0.0 - 0.4 K/UL    ABS. BASOPHILS 0.1 0.0 - 0.1 K/UL   METABOLIC PANEL, COMPREHENSIVE    Collection Time: 04/07/17 12:14 AM   Result Value Ref Range    Sodium 138 136 - 145 mmol/L    Potassium 4.5 3.5 - 5.1 mmol/L    Chloride 99 97 - 108 mmol/L    CO2 35 (H) 21 - 32 mmol/L    Anion gap 4 (L) 5 - 15 mmol/L    Glucose 92 65 - 100 mg/dL    BUN 30 (H) 6 - 20 MG/DL    Creatinine 1.11 0.70 - 1.30 MG/DL    BUN/Creatinine ratio 27 (H) 12 - 20      GFR est AA >60 >60 ml/min/1.73m2    GFR est non-AA >60 >60 ml/min/1.73m2    Calcium 9.5 8.5 - 10.1 MG/DL    Bilirubin, total 0.4 0.2 - 1.0 MG/DL    ALT (SGPT) 19 12 - 78 U/L    AST (SGOT) 23 15 - 37 U/L    Alk.  phosphatase 88 45 - 117 U/L    Protein, total 8.3 (H) 6.4 - 8.2 g/dL    Albumin 2.9 (L) 3.5 - 5.0 g/dL    Globulin 5.4 (H) 2.0 - 4.0 g/dL    A-G Ratio 0.5 (L) 1.1 - 2.2     URINALYSIS W/ RFLX MICROSCOPIC    Collection Time: 04/07/17 12:45 AM   Result Value Ref Range    Color DARK YELLOW      Appearance TURBID (A) CLEAR      Specific gravity 1.025 1.003 - 1.030      pH (UA) 5.5 5.0 - 8.0      Protein 100 (A) NEG mg/dL    Glucose NEGATIVE  NEG mg/dL    Ketone TRACE (A) NEG mg/dL    Bilirubin NEGATIVE  NEG      Blood LARGE (A) NEG      Urobilinogen 1.0 0.2 - 1.0 EU/dL    Nitrites POSITIVE (A) NEG      Leukocyte Esterase MODERATE (A) NEG      WBC 20-50 0 - 4 /hpf    RBC 20-50 0 - 5 /hpf    Epithelial cells FEW FEW /lpf    Bacteria 4+ (A) NEG /hpf    Mucus TRACE (A) NEG /lpf    Hyaline cast 2-5 0 - 5 /lpf     IMAGING RESULTS:  EXAM: Supine and decubitus views of the abdomen and a frontal view of the chest  are provided.     INDICATION: r/o pna, constipation     Colon is moderately gas-distended with air-fluid levels present without fecal  impaction or small bowel dilation, possibly representing ileus. No significant  stool seen. There is no free intraperitoneal air. There is no evident  organomegaly or significant intraabdominal calcification.     The lungs are free of acute disease and significant findings. Heart size is  large. There is no overt pulmonary edema.     IMPRESSION  IMPRESSION:   1. No acute cardiopulmonary disease. 2. Colonic distention, possibly representing ileus. No distinct pattern of bowel  obstruction. No significant stool seen. MEDICATIONS GIVEN:  Medications   magnesium citrate solution 296 mL (296 mL Per G Tube Given 4/7/17 0048)   cefTRIAXone (ROCEPHIN) 1 g in 0.9% sodium chloride (MBP/ADV) 50 mL (0 g IntraVENous IV Completed 4/7/17 0231)   sodium chloride 0.9 % bolus infusion 1,000 mL (0 mL IntraVENous IV Completed 4/7/17 0324)     IMPRESSION:  1. Acute cystitis with hematuria      PLAN:  1. Current Discharge Medication List      START taking these medications    Details   cephALEXin (KEFLEX) 500 mg capsule Take 1 Cap by mouth four (4) times daily for 7 days. Qty: 28 Cap, Refills: 0      magnesium citrate solution One bottle to gtube. Repeat with the second bottle if you do not note a large amount of output after 1 hour. Qty: 2 Bottle, Refills: 0           2. Follow-up Information     Follow up With Details Comments [address] [place], [place]  for symptom recheck [facility]  [phone]      [facility] EMERGENCY DEPT  If symptoms worsen [address] [place] [address]  [phone]        Return to ED if worse     DISCHARGE NOTE:    3:55 AM  The patient is ready for discharge. The patient signs, symptoms, diagnosis, and discharge instructions have been discussed and the patient has conveyed their understanding.  The patient is to follow-up as reccommended or returned to the ER should their symptoms worsen. Plan has been discussed and patient is in agreement. This note is prepared by Jeff Lambert acting as Scribe for Ashish Gardiner MD.    Ashish Gardiner MD: This scribe's documentation has been prepared under my direction and personally reviewed by me in its entirety. I confirm that the note above accurately reflects all work, treatment procedures and medical decision makings performed by me.

## 2017-04-07 NOTE — DISCHARGE INSTRUCTIONS

## 2017-04-07 NOTE — ED NOTES
Dr. Jose Zacarias has reviewed discharge instructions with the patient and daughter. The patient and daughter verbalized understanding.

## 2017-04-10 NOTE — PROGRESS NOTES
Left VM on home number (unsure if mobile number is correct as it is written one way on the demographic sheet and one number appears transposed on the Emergency Contact info.)  Pt sent home with keflex; however, this does not appear on the susceptibility list.  If patient feeling improved, would recommend follow up with primary care for recollection of urine to ensure resolution, as patient had no urinary sx in the ED (seen for abdominal discomfort/constipation, relieved with relieving pressure off of his NG tube).

## 2017-04-11 NOTE — PROGRESS NOTES
Patient's daughter called back. She verified demographics and is listed as an emergency contact. Informed of the UC results. Notes that her father's urine color is improving though he is still not having any urinary Sx secondary to advanced stages of ALS. Alberto Vernon has a PCP appointment at the [facility] this Friday afternoon. Encouraged to have another UA and UC done to ensure resolution.

## 2017-04-26 NOTE — TELEPHONE ENCOUNTER
Adriana Persaud is asking if we could re-write the letter (see your basket) from us to help with her father's care and finances. I'll be glad to do it if you approve.

## 2017-04-28 NOTE — TELEPHONE ENCOUNTER
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